# Patient Record
(demographics unavailable — no encounter records)

---

## 2024-10-22 NOTE — PHYSICAL EXAM
[de-identified] : Left knee patient presents as a fair amount of discomfort to the medial joint line range of motion 0 to 125 degrees there is no locking.  Negative Alton's sign soft tissue and warmth is noted but no obvious effusion.  The knee is stable extra stress valgus present with full extension and 90 degrees of flexion. [de-identified] : Results of recent outside radiographs as well as MRI findings as stated above in the history

## 2024-10-22 NOTE — HISTORY OF PRESENT ILLNESS
[de-identified] : This is a first-time visit for this 66-year-old gentleman he is here with complaint of left knee pain.  Patient had a recent MRI as well as radiographs of the left knee available for review.  He states he has pain going up and down stairs and getting out of a seated position is ongoing for few months he recalls no specific accident injury initiating traumatic event.  Patient complains of intermittent sharp locking sensation as well as pain on the medial aspect of the left knee previous radiographs performed in October 1 of this year show well-preserved joint spaces in the tibiofemoral as well as patellofemoral articulation.  MRI of the left knee that was performed [facility] indicates a medial meniscal tear otherwise well-preserved joint spaces.  Patient is here for first-time evaluation.

## 2024-10-22 NOTE — DISCUSSION/SUMMARY
[de-identified] : Patient I discussed at length the probable underlying etiology of his current left medial knee pain.  We are able to review directly with the patient the images of the radiographs taken recently he was able to appreciate how the joint space in the left medial compartment of the knee on standing AP projection is well-preserved.  We also reviewed the findings of the recent MRI and indicated the patient if he has fairly large medial meniscal tear..  Utilizing a model as well as a drawing we are able to review the pertinent anatomy of the joint patient was able to appreciate how a torn medial meniscus because symptoms very similar to his complaints.  At this point we talked about her options I believe the patient would benefit from a trial of Celebrex 200 mg p.o. twice daily for 6-day course then to be taken thereafter as needed.  The reasonable risk and benefits of medication were discussed in detail including potential side effects.  Reviewed the patient her medication profile and appears to be no relative current contraindication to his limited use.  We also talked at length about the possibility to require knee arthroscopic surgery if symptoms persist despite conservative measures.  We briefly touched on reasonable risk and benefits of the procedure including typical convalescence expectations.  Patient will be returning to the office 10 days to 2 weeks from now if he continues to be symptomatic that point may consider other options including potential arthroscopic surgery based on the medial meniscal tear depending on the level of his discomfort.  This consultation lasted 35 minutes.

## 2024-10-22 NOTE — REASON FOR VISIT
[Initial Visit] : an initial visit for [Knee Pain] : knee pain [FreeTextEntry2] : PAIN IN BOTH KNEES GOING DOWN THE CALVES, NOTHING HAS HELPED

## 2024-11-19 NOTE — DISCUSSION/SUMMARY
[de-identified] : Patient will continue to use compression stocking on the left lower extremity.  He will continue to take Celebrex which she has remaining tablets at home for some swelling and ice the knee but he will also use a warm compress to help improve the rate of the swelling diminishment.  Patient will follow-up in 2 weeks time for repeat evaluation of these continues to be symptomatic.  This consultation lasted 30 minutes.

## 2024-11-19 NOTE — HISTORY OF PRESENT ILLNESS
[de-identified] : Patient returns today he is left knee continues to have discomfort he had a recent episode of swelling in the posterior aspect of the knee and then swelling distally she had a recent DVT evaluation via ultrasound which was negative.  The findings of the ultrasound also indicated remnants of a popliteal cyst.  Patient is here for first-time evaluation ever since the swelling occurred.